# Patient Record
Sex: FEMALE | Race: WHITE | Employment: UNEMPLOYED | URBAN - NONMETROPOLITAN AREA
[De-identification: names, ages, dates, MRNs, and addresses within clinical notes are randomized per-mention and may not be internally consistent; named-entity substitution may affect disease eponyms.]

---

## 2018-03-15 ENCOUNTER — HOSPITAL ENCOUNTER (OUTPATIENT)
Dept: GENERAL RADIOLOGY | Age: 53
Discharge: HOME | End: 2018-03-15
Payer: COMMERCIAL

## 2018-03-15 ENCOUNTER — HOSPITAL ENCOUNTER (OUTPATIENT)
Age: 53
Discharge: HOME | End: 2018-03-15
Payer: COMMERCIAL

## 2018-03-15 DIAGNOSIS — S73.109A SPRAIN OF HIP, UNSPECIFIED LATERALITY, INITIAL ENCOUNTER: ICD-10-CM

## 2018-03-15 PROCEDURE — 73502 X-RAY EXAM HIP UNI 2-3 VIEWS: CPT

## 2024-06-21 ENCOUNTER — APPOINTMENT (OUTPATIENT)
Dept: GENERAL RADIOLOGY | Age: 59
End: 2024-06-21
Payer: COMMERCIAL

## 2024-06-21 ENCOUNTER — HOSPITAL ENCOUNTER (EMERGENCY)
Age: 59
Discharge: HOME | End: 2024-06-21
Attending: STUDENT IN AN ORGANIZED HEALTH CARE EDUCATION/TRAINING PROGRAM
Payer: COMMERCIAL

## 2024-06-21 VITALS
RESPIRATION RATE: 16 BRPM | HEIGHT: 64 IN | BODY MASS INDEX: 37.56 KG/M2 | SYSTOLIC BLOOD PRESSURE: 142 MMHG | TEMPERATURE: 98.6 F | OXYGEN SATURATION: 98 % | DIASTOLIC BLOOD PRESSURE: 65 MMHG | WEIGHT: 220 LBS | HEART RATE: 59 BPM

## 2024-06-21 DIAGNOSIS — Z98.890 S/P CLOSED REDUCTION OF DISLOCATED TOTAL HIP PROSTHESIS: Primary | ICD-10-CM

## 2024-06-21 DIAGNOSIS — M25.551 RIGHT HIP PAIN: ICD-10-CM

## 2024-06-21 PROCEDURE — 73502 X-RAY EXAM HIP UNI 2-3 VIEWS: CPT

## 2024-06-21 PROCEDURE — 99153 MOD SED SAME PHYS/QHP EA: CPT

## 2024-06-21 PROCEDURE — 96374 THER/PROPH/DIAG INJ IV PUSH: CPT

## 2024-06-21 PROCEDURE — 27265 TREAT HIP DISLOCATION: CPT

## 2024-06-21 PROCEDURE — 96375 TX/PRO/DX INJ NEW DRUG ADDON: CPT

## 2024-06-21 PROCEDURE — 99285 EMERGENCY DEPT VISIT HI MDM: CPT

## 2024-06-21 PROCEDURE — 73552 X-RAY EXAM OF FEMUR 2/>: CPT

## 2024-06-21 PROCEDURE — 2500000003 HC RX 250 WO HCPCS

## 2024-06-21 PROCEDURE — 6360000002 HC RX W HCPCS: Performed by: STUDENT IN AN ORGANIZED HEALTH CARE EDUCATION/TRAINING PROGRAM

## 2024-06-21 PROCEDURE — 72190 X-RAY EXAM OF PELVIS: CPT

## 2024-06-21 PROCEDURE — 99155 MOD SED OTH PHYS/QHP <5 YRS: CPT

## 2024-06-21 PROCEDURE — 6360000002 HC RX W HCPCS

## 2024-06-21 PROCEDURE — 99152 MOD SED SAME PHYS/QHP 5/>YRS: CPT

## 2024-06-21 RX ORDER — KETAMINE HYDROCHLORIDE 10 MG/ML
1 INJECTION, SOLUTION INTRAMUSCULAR; INTRAVENOUS ONCE
Status: COMPLETED | OUTPATIENT
Start: 2024-06-21 | End: 2024-06-21

## 2024-06-21 RX ORDER — KETAMINE HYDROCHLORIDE 10 MG/ML
1 INJECTION, SOLUTION INTRAMUSCULAR; INTRAVENOUS ONCE
Status: DISCONTINUED | OUTPATIENT
Start: 2024-06-21 | End: 2024-06-21 | Stop reason: CLARIF

## 2024-06-21 RX ORDER — MORPHINE SULFATE 4 MG/ML
4 INJECTION, SOLUTION INTRAMUSCULAR; INTRAVENOUS ONCE
Status: COMPLETED | OUTPATIENT
Start: 2024-06-21 | End: 2024-06-21

## 2024-06-21 RX ORDER — FENTANYL CITRATE 50 UG/ML
50 INJECTION, SOLUTION INTRAMUSCULAR; INTRAVENOUS ONCE
Status: COMPLETED | OUTPATIENT
Start: 2024-06-21 | End: 2024-06-21

## 2024-06-21 RX ORDER — PROPOFOL 10 MG/ML
1 INJECTION, EMULSION INTRAVENOUS ONCE
Status: COMPLETED | OUTPATIENT
Start: 2024-06-21 | End: 2024-06-21

## 2024-06-21 RX ADMIN — MORPHINE SULFATE 4 MG: 4 INJECTION, SOLUTION INTRAMUSCULAR; INTRAVENOUS at 17:51

## 2024-06-21 RX ADMIN — Medication 75 MG: at 20:12

## 2024-06-21 RX ADMIN — FENTANYL CITRATE 50 MCG: 50 INJECTION, SOLUTION INTRAMUSCULAR; INTRAVENOUS at 17:26

## 2024-06-21 RX ADMIN — PROPOFOL 190 MG: 10 INJECTION, EMULSION INTRAVENOUS at 20:13

## 2024-06-21 ASSESSMENT — PAIN - FUNCTIONAL ASSESSMENT: PAIN_FUNCTIONAL_ASSESSMENT: NONE - DENIES PAIN

## 2024-06-21 NOTE — ED PROVIDER NOTES
[facility] EMERGENCY DEPARTMENT    EMERGENCY MEDICINE     Patient Name: Citlaly Good  MRN: [id number]  YOB: 1965  Date of Evaluation: 6/21/2024  Treating Resident Physician: Joanna Argueta DO  Supervising Physician: Dr. Dhruv Vyas DO    CHIEF COMPLAINT       Chief Complaint   Patient presents with    Fall    Hip Pain     Right       HISTORY OF PRESENT ILLNESS      History obtained from chart review and the patient.    Citlaly Good is a 59 y.o. female who presents to the emergency department from home brought in by EMS for evaluation of Fall.  Patient was bending over in her yard today she felt pop in her right hip.  Patient does have a history of right hip replacement.  She denied having a fall just felt like a \"pop \"  Denied being on any blood thinners.  Did not hit her head no fall.  No chest pain no abdominal pain no back pain no other concerns.  She was brought in by [facility], EMS was placed on backboard.   Patient also does have abdominal rash for the past week denies any fevers chest pain shortness of breath dysuria hematuria.     Pertinent previous and/or external records reviewed: Non-contributory    PAST MEDICAL AND SURGICAL HISTORY   History reviewed. No pertinent past medical history.    History reviewed. No pertinent surgical history.    CURRENT MEDICATIONS     There are no discharge medications for this patient.      ALLERGIES   No Known Allergies    FAMILY HISTORY   History reviewed. No pertinent family history.    SOCIAL HISTORY     Social History     Tobacco Use    Smoking status: Never    Smokeless tobacco: Never   Substance Use Topics    Alcohol use: Never    Drug use: Never       PHYSICAL EXAM     ED Triage Vitals [06/21/24 1707]   BP Temp Temp Source Pulse Respirations SpO2 Height Weight - Scale   (!) 155/109 98.6 °F (37 °C) Oral 82 20 100 % 1.626 m (5' 4\") 99.8 kg (220 lb)       Initial vital signs and nursing assessment reviewed and normal. Body mass index is 37.76

## 2024-06-21 NOTE — ED TRIAGE NOTES
Patient to room 5 via [facility] following a fall in her back yard today.  Per report, patient was bending over and lost her balance and fell to the ground.  Patient has a history of bilateral hip replacements.  Patient reports 9/10 pain to her right hip/groin area.  Patient presents on a backboard.  She denies use of blood thinners.

## 2024-06-21 NOTE — ED NOTES
Pharmacy called at this time regarding Ketamine. Will send someone down soon to hand deliver medication.

## 2024-06-22 NOTE — DISCHARGE INSTRUCTIONS
You were seen at the emergency department today for right hip pain.  There was a dislocation which was successfully reduced into position.  You were placed in a right knee immobilizer please follow-up with [facility] of orthopedics within the next few days.  Take Motrin and Tylenol alternately for pain as needed.  If you have any worsening hip pain any falls knee or other joint concerns please go to nearest emergency department.

## 2024-06-22 NOTE — SEDATION DOCUMENTATION
Pt states she is in no pain. Even and unlabored respirations. Updated on POC. Family at bedside. Telemetry placed.

## 2024-06-22 NOTE — PROCEDURES
PROCEDURE NOTE  Date: 2024   Name: Citlaly Good  YOB: 1965    Procedural sedation    Date/Time: 2024 8:25 PM    Performed by: Desean Hunter MD  Authorized by: Dhruv Vyas DO    Consent:     Consent obtained:  Written    Consent given by:  Patient    Risks discussed:  Allergic reaction, inadequate sedation and respiratory compromise necessitating ventilatory assistance and intubation  Universal protocol:     Patient identity confirmed:  Verbally with patient and hospital-assigned identification number  Indications:     Procedure performed:  Dislocation reduction    Procedure necessitating sedation performed by:  Different physician    Intended level of sedation:  Moderate  Pre-sedation assessment:     Mouth openin finger widths    Thyromental distance:  3 finger widths    Mallampati score:  I - soft palate, uvula, fauces, pillars visible    Neck mobility: normal      Pre-sedation assessments completed and reviewed: airway patency    Immediate pre-procedure details:     Reviewed: vital signs and relevant labs/tests      Verified: bag valve mask available, emergency equipment available, intubation equipment available, IV patency confirmed, oxygen available and suction available    Procedure details (see MAR for exact dosages):     Preoxygenation:  Nasal cannula    Sedation:  Propofol (ketamine)    Intra-procedure monitoring:  Blood pressure monitoring, continuous capnometry, continuous pulse oximetry, cardiac monitor and frequent vital sign checks    Intra-procedure events: none      Intra-procedure management:  Airway repositioning  Post-procedure details:     Post-sedation assessments completed and reviewed: airway patency      Specimens recovered:  None    Patient is stable for discharge or admission: yes      Procedure completion:  Tolerated

## 2024-06-22 NOTE — SEDATION DOCUMENTATION
Pt tolerating conscious sedation well. Dr. Darden at bedside for assistance at this time. Even and unlabored respirations.

## 2024-12-23 ENCOUNTER — HOSPITAL ENCOUNTER (EMERGENCY)
Age: 59
Discharge: HOME | End: 2024-12-23
Payer: COMMERCIAL

## 2024-12-23 VITALS
DIASTOLIC BLOOD PRESSURE: 70 MMHG | BODY MASS INDEX: 37.76 KG/M2 | RESPIRATION RATE: 20 BRPM | OXYGEN SATURATION: 98 % | SYSTOLIC BLOOD PRESSURE: 160 MMHG | HEART RATE: 78 BPM | HEIGHT: 64 IN | TEMPERATURE: 97.5 F

## 2024-12-23 DIAGNOSIS — J01.00 ACUTE NON-RECURRENT MAXILLARY SINUSITIS: Primary | ICD-10-CM

## 2024-12-23 PROCEDURE — 99213 OFFICE O/P EST LOW 20 MIN: CPT

## 2024-12-23 PROCEDURE — 99203 OFFICE O/P NEW LOW 30 MIN: CPT

## 2024-12-23 RX ORDER — PREDNISONE 10 MG/1
TABLET ORAL
Qty: 20 TABLET | Refills: 0 | Status: SHIPPED | OUTPATIENT
Start: 2024-12-23 | End: 2025-01-02

## 2024-12-23 RX ORDER — BROMPHENIRAMINE MALEATE, PSEUDOEPHEDRINE HYDROCHLORIDE, AND DEXTROMETHORPHAN HYDROBROMIDE 2; 30; 10 MG/5ML; MG/5ML; MG/5ML
10 SYRUP ORAL 4 TIMES DAILY PRN
Qty: 118 ML | Refills: 0 | Status: SHIPPED | OUTPATIENT
Start: 2024-12-23

## 2024-12-23 ASSESSMENT — PAIN - FUNCTIONAL ASSESSMENT: PAIN_FUNCTIONAL_ASSESSMENT: NONE - DENIES PAIN

## 2024-12-23 NOTE — ED NOTES
Pt with complaints of sinus pressure, cough, bilateral ear pain and fever that started 2 weeks ago. States she has tried robitussin, tylenol and ibuprofen which has helped a little bit. States cough is productive with green mucus.     Paul Irving LPN  12/23/24 1849

## 2024-12-23 NOTE — ED PROVIDER NOTES
[facility] URGENT CARE      URGENT CARE     Pt Name: Citlaly Good  MRN: [id number]  Birthdate 1965  Date of evaluation: 12/23/2024  Provider: IAM Brito CNP    Urgent Care Encounter     CHIEF COMPLAINT       Chief Complaint   Patient presents with    Sinusitis    Cough    Ear Pain     gary    Fever     HISTORY OF PRESENT ILLNESS   Citlaly Good is a 59 y.o. female who presents urgent care with chief complaint of sinus congestion/facial pain, cough bilateral ear itchiness and fevers.  Highest temperature checked was 101 3 days ago.  Symptoms started 2 weeks ago.  Treating with OTC meds including Robitussin and Tylenol/Motrin without improvement.  Denies immunocompromise status.  Denies chest pains but admits to intermittent shortness of breath.  Denies nausea vomiting or diarrhea.    History obtained from patient    PAST MEDICAL HISTORY   History reviewed. No pertinent past medical history.  SURGICALHISTORY     Patient  has a past surgical history that includes joint replacement (Bilateral); Closed reduction congential hip dislocation (Right); and Cholecystectomy.  CURRENT MEDICATIONS       Previous Medications    No medications on file     ALLERGIES     Patient is has No Known Allergies.  Patients   Immunization History   Administered Date(s) Administered    COVID-19, J&J, (age 18y+), IM, 0.5 mL 05/04/2021    COVID-19, PFIZER PURPLE top, DILUTE for use, (age 12 y+), 30mcg/0.3mL 02/15/2022     FAMILY HISTORY     Patient's family history is not on file.  SOCIAL HISTORY     Patient  reports that she has never smoked. She has never used smokeless tobacco. She reports that she does not drink alcohol and does not use drugs.  PHYSICAL EXAM     ED TRIAGE VITALS  BP: (!) 160/70, Temp: 97.5 °F (36.4 °C), Pulse: 78, Respirations: 20, SpO2: 98 %,Estimated body mass index is 37.76 kg/m² as calculated from the following:    Height as of this encounter: 1.626 m (5' 4\").    Weight as of   Motor: No weakness.   Psychiatric:         Mood and Affect: Mood normal.         Behavior: Behavior normal.   Genitourinary:  Deferred    DIAGNOSTIC RESULTS   Labs:No results found for this visit on 12/23/24.  IMAGING:  No orders to display     EKG:  URGENT CARE COURSE:     Vitals:    12/23/24 1847   BP: (!) 160/70   Pulse: 78   Resp: 20   Temp: 97.5 °F (36.4 °C)   TempSrc: Oral   SpO2: 98%   Height: 1.626 m (5' 4\")     Medications - No data to display  PROCEDURES: (None if blank)  Procedures:   FINAL IMPRESSION      1. Acute non-recurrent maxillary sinusitis      URGENT CARE TIMELINE: DISPOSITION/ PLAN AND DECISION MAKING     ED Course as of 12/23/24 1858   Mon Dec 23, 2024   1851 BP(!): 160/70  Hypertension.  No fever.  No hypoxia. [JR]      ED Course User Index  [JR] Viktor Wesley APRN - CNP     PATIENT REFERRED TO:  Michael Ayala MD  [address] [id number]  DISCHARGE MEDICATIONS:  New Prescriptions    AMOXICILLIN-CLAVULANATE (AUGMENTIN) 875-125 MG PER TABLET    Take 1 tablet by mouth 2 times daily for 5 days    BROMPHENIRAMINE-PSEUDOEPHEDRINE-DM 2-30-10 MG/5ML SYRUP    Take 10 mLs by mouth 4 times daily as needed for Cough or Congestion    PREDNISONE (DELTASONE) 10 MG TABLET    Take 4 tablets by mouth once daily for 5 days     Discontinued Medications    No medications on file     Current Discharge Medication List        IAM Brito CNP    (Please note that portions of this note were completed with a voice recognition program. Efforts were made to edit the dictations but occasionally words are mis-transcribed.)            Viktor Wesley APRN - CNP  12/23/24 1858

## 2024-12-23 NOTE — DISCHARGE INSTRUCTIONS
Please take prednisone/antibiotics as prescribed for the recommended duration even if you are feeling better.  You take Bromfed on a as needed basis for persistent cough/congestion that is not resolved by steroid/antibiotics.    Hydrate well keeping urine clear/pale yellow.  Side effects of antibiotics including sensitivity to sun, diarrhea and may make you more vulnerable to opportunistic infections.  Please use probiotics and practice good hand hygiene while you are taking his medications.    Please see your family doctor if symptoms fail to improve or sooner if they worsen.    If any of your symptoms are urgent/emergent or out-of-control please report to urgent care/ER or call 911.    I hope you are feeling better soon!